# Patient Record
Sex: MALE | Race: BLACK OR AFRICAN AMERICAN | NOT HISPANIC OR LATINO | Employment: OTHER | URBAN - METROPOLITAN AREA
[De-identification: names, ages, dates, MRNs, and addresses within clinical notes are randomized per-mention and may not be internally consistent; named-entity substitution may affect disease eponyms.]

---

## 2022-03-19 ENCOUNTER — HOSPITAL ENCOUNTER (EMERGENCY)
Facility: HOSPITAL | Age: 22
Discharge: HOME | End: 2022-03-19
Attending: EMERGENCY MEDICINE
Payer: MEDICARE

## 2022-03-19 VITALS
DIASTOLIC BLOOD PRESSURE: 70 MMHG | RESPIRATION RATE: 18 BRPM | OXYGEN SATURATION: 100 % | HEIGHT: 65 IN | BODY MASS INDEX: 24.99 KG/M2 | WEIGHT: 150 LBS | SYSTOLIC BLOOD PRESSURE: 118 MMHG | TEMPERATURE: 99 F | HEART RATE: 60 BPM

## 2022-03-19 DIAGNOSIS — J30.9 ALLERGIC RHINITIS, UNSPECIFIED SEASONALITY, UNSPECIFIED TRIGGER: ICD-10-CM

## 2022-03-19 DIAGNOSIS — J10.1 INFLUENZA A: Primary | ICD-10-CM

## 2022-03-19 LAB
CTP QC/QA: YES
INFLUENZA A ANTIGEN, POC: POSITIVE
INFLUENZA B ANTIGEN, POC: NEGATIVE
POC RAPID STREP A: NEGATIVE
SARS-COV-2 RDRP RESP QL NAA+PROBE: NEGATIVE

## 2022-03-19 PROCEDURE — 25000003 PHARM REV CODE 250: Mod: ER | Performed by: NURSE PRACTITIONER

## 2022-03-19 PROCEDURE — 99284 EMERGENCY DEPT VISIT MOD MDM: CPT | Mod: 25,ER

## 2022-03-19 PROCEDURE — 87804 INFLUENZA ASSAY W/OPTIC: CPT | Mod: ER

## 2022-03-19 PROCEDURE — U0002 COVID-19 LAB TEST NON-CDC: HCPCS | Mod: ER | Performed by: NURSE PRACTITIONER

## 2022-03-19 RX ORDER — LORATADINE 10 MG/1
10 TABLET ORAL DAILY
Qty: 30 TABLET | Refills: 0 | Status: SHIPPED | OUTPATIENT
Start: 2022-03-19 | End: 2022-04-18

## 2022-03-19 RX ORDER — OSELTAMIVIR PHOSPHATE 75 MG/1
75 CAPSULE ORAL 2 TIMES DAILY
Qty: 10 CAPSULE | Refills: 0 | Status: SHIPPED | OUTPATIENT
Start: 2022-03-19 | End: 2022-03-24

## 2022-03-19 RX ORDER — IBUPROFEN 600 MG/1
600 TABLET ORAL EVERY 6 HOURS PRN
Qty: 20 TABLET | Refills: 0 | Status: SHIPPED | OUTPATIENT
Start: 2022-03-19 | End: 2022-03-24

## 2022-03-19 RX ORDER — IBUPROFEN 600 MG/1
600 TABLET ORAL
Status: COMPLETED | OUTPATIENT
Start: 2022-03-19 | End: 2022-03-19

## 2022-03-19 RX ORDER — FLUTICASONE PROPIONATE 50 MCG
1 SPRAY, SUSPENSION (ML) NASAL 2 TIMES DAILY PRN
Qty: 15 G | Refills: 0 | Status: SHIPPED | OUTPATIENT
Start: 2022-03-19 | End: 2022-04-02

## 2022-03-19 RX ORDER — DEXTROMETHORPHAN HYDROBROMIDE, GUAIFENESIN 5; 100 MG/5ML; MG/5ML
650 LIQUID ORAL EVERY 8 HOURS
Qty: 20 TABLET | Refills: 0 | Status: SHIPPED | OUTPATIENT
Start: 2022-03-19 | End: 2022-03-24

## 2022-03-19 RX ORDER — GUAIFENESIN/DEXTROMETHORPHAN 100-10MG/5
5 SYRUP ORAL 4 TIMES DAILY PRN
Qty: 240 ML | Refills: 0 | Status: SHIPPED | OUTPATIENT
Start: 2022-03-19 | End: 2022-03-29

## 2022-03-19 RX ADMIN — IBUPROFEN 600 MG: 600 TABLET ORAL at 11:03

## 2022-03-19 NOTE — Clinical Note
"Armand Duronlaurel" Andrew was seen and treated in our emergency department on 3/19/2022.  He may return to work on 03/25/2022.       If you have any questions or concerns, please don't hesitate to call.      LUKAS DuranP"

## 2022-03-19 NOTE — ED PROVIDER NOTES
Encounter Date: 3/19/2022    SCRIBE #1 NOTE: I, Reginald Vasquez, am scribing for, and in the presence of,  Deidra Patel NP. I have scribed the following portions of the note - Other sections scribed: HPI, ROS, PE.       History     Chief Complaint   Patient presents with    URI     Complains of frontal headache, nasal congestion, sore throat, and cough x2 days.      Armand Morales 21 y.o. male, with no known pertinent PMHx, presents to the ED with headache, nasal congestion, sore throat, and post nasal drip for 2 days. Patient endorses the use of OTC medications for his symptoms with some reported congestion relief. Patient denies any known sick contact. Patient denies rash, fever, chest pain, SOB, numbness, weakness, tingling, abdominal pain, back pain, dysuria, hematuria, nausea, vomiting, diarrhea, or any other complaints.  Patient rates the pain as 10 of 10 and reports Tylenol use 30 min PTA.  No known alleviating or aggravating factors.  Reports Marijuana use, no tobacco.    The history is provided by the patient. No  was used.     Review of patient's allergies indicates:  No Known Allergies  History reviewed. No pertinent past medical history.  History reviewed. No pertinent surgical history.  Family History   Problem Relation Age of Onset    Asthma Mother      Social History     Tobacco Use    Smoking status: Never Smoker    Smokeless tobacco: Never Used   Substance Use Topics    Alcohol use: No    Drug use: Yes     Types: Marijuana     Review of Systems   Constitutional: Negative for chills, fatigue and fever.   HENT: Positive for congestion, postnasal drip and sore throat. Negative for ear pain and rhinorrhea.    Eyes: Negative for pain, discharge and redness.   Respiratory: Negative for cough and shortness of breath.    Cardiovascular: Negative for chest pain.   Gastrointestinal: Negative for abdominal pain, diarrhea, nausea and vomiting.   Genitourinary: Negative for dysuria.    Musculoskeletal: Negative for back pain, neck pain and neck stiffness.   Skin: Negative for rash.   Neurological: Positive for headaches. Negative for dizziness, weakness, light-headedness and numbness.   Psychiatric/Behavioral: Negative for confusion.       Physical Exam     Initial Vitals [03/19/22 1105]   BP Pulse Resp Temp SpO2   121/73 65 14 99 °F (37.2 °C) 100 %      MAP       --         Physical Exam    Nursing note and vitals reviewed.  Constitutional: Vital signs are normal. He appears well-developed. He is cooperative.  Non-toxic appearance. He does not appear ill.   HENT:   Head: Normocephalic and atraumatic.   Right Ear: Tympanic membrane and ear canal normal.   Left Ear: Tympanic membrane and ear canal normal.   Nose: Mucosal edema present. Right sinus exhibits no maxillary sinus tenderness and no frontal sinus tenderness. Left sinus exhibits no maxillary sinus tenderness and no frontal sinus tenderness.   Mouth/Throat: Uvula is midline, oropharynx is clear and moist and mucous membranes are normal.   Post nasal drip   Eyes: Conjunctivae are normal.   Neck: No Brudzinski's sign and no Kernig's sign noted.   Normal range of motion.  Cardiovascular: Normal rate, regular rhythm and normal heart sounds.   Pulmonary/Chest: Effort normal and breath sounds normal. No accessory muscle usage. No tachypnea. No respiratory distress. He exhibits no tenderness.   Abdominal: Abdomen is soft. There is no abdominal tenderness.   Musculoskeletal:      Cervical back: Normal range of motion.     Neurological: He is alert and oriented to person, place, and time. He has normal strength. No cranial nerve deficit or sensory deficit. Gait normal. GCS eye subscore is 4. GCS verbal subscore is 5. GCS motor subscore is 6.   Skin: Skin is warm, dry and intact. No rash noted.   Psychiatric: He has a normal mood and affect. His speech is normal and behavior is normal. Judgment and thought content normal.         ED Course    Procedures  Labs Reviewed   POCT RAPID INFLUENZA A/B - Abnormal; Notable for the following components:       Result Value    Catya A Ag positive (*)     All other components within normal limits   SARS-COV-2 RDRP GENE    Narrative:     This test utilizes isothermal nucleic acid amplification   technology to detect the SARS-CoV-2 RdRp nucleic acid segment.   The analytical sensitivity (limit of detection) is 125 genome   equivalents/mL.   A POSITIVE result implies infection with the SARS-CoV-2 virus;   the patient is presumed to be contagious.     A NEGATIVE result means that SARS-CoV-2 nucleic acids are not   present above the limit of detection. A NEGATIVE result should be   treated as presumptive. It does not rule out the possibility of   COVID-19 and should not be the sole basis for treatment decisions.   If COVID-19 is strongly suspected based on clinical and exposure   history, re-testing using an alternate molecular assay should be   considered.   This test is only for use under the Food and Drug   Administration s Emergency Use Authorization (EUA).   Commercial kits are provided by [employer].   Performance characteristics of the EUA have been independently   verified by [facility] Department of   Pathology and Laboratory Medicine.   _________________________________________________________________   The authorized Fact Sheet for Healthcare Providers and the authorized Fact   Sheet for Patients of the ID NOW COVID-19 are available on the FDA   website:     https://www.fda.gov/media/506083/download  https://www.fda.gov/media/041848/download          POCT INFLUENZA A/B MOLECULAR   POCT STREP A MOLECULAR   POCT STREP A, RAPID          Imaging Results    None          Medications   ibuprofen tablet 600 mg (600 mg Oral Given 3/19/22 1134)     Medical Decision Making:   Clinical Tests:   Lab Tests: Ordered and Reviewed       APC / Resident Notes:   This is an evaluation of a 21 y.o. male  that presents to the Emergency Department for URI symptoms. The patient is a non-toxic, afebrile, and well appearing male. On physical exam: Ears: without infection.  Pharynx without infection. Appears well hydrated with moist mucus membranes. Neck soft and supple with no meningeal signs or cervical lymphadenopathy. Breath sounds are clear and equal bilaterally with no adventitious breath sounds, tachypnea or respiratory distress with room air pulse ox of 100% and no evidence of hypoxia.  Normal Neuro exam, normal gait; mucosal edema with post nasal drip    Vital Signs Are Reassuring. RESULTS: COVID negative, Influenza positive, Strep negative    The patient is within the antiviral treatment window and has been offered treatment with Tamilfu. Risks/Benefits discussed. [employer] information handout will be on the discharge paperwork.     My overall impression is Influenza A, allergic rhinitis. I considered, but at this time, do not suspect OM, OE, COVID, strep pharyngitis, meningitis, pneumonia, bacterial sinusitis, or significant dehydration requiring IV fluids or admission.    D/C Meds: Tamiflu, robitussin DM, floanse, Claritin. D/C Information: Tylenol/Ibuprofen PRN, Hydration. The diagnosis, treatment plan, instructions for follow-up and reevaluation with Primary Care as well as ED return precautions were discussed and understanding was verbalized. All questions or concerns have been addressed.        Scribe Attestation:   Scribe #1: I performed the above scribed service and the documentation accurately describes the services I performed. I attest to the accuracy of the note.               I, MG Hameed, personally performed the services described in the documentation. All medical record entries made by the scribe were at my direction and in my presence. I have reviewed the chart and agree that the record reflects my personal performance and is accurate and complete.    Clinical Impression:   Final  diagnoses:  [J10.1] Influenza A (Primary)  [J30.9] Allergic rhinitis, unspecified seasonality, unspecified trigger          ED Disposition Condition    Discharge Stable        ED Prescriptions     Medication Sig Dispense Start Date End Date Auth. Provider    oseltamivir (TAMIFLU) 75 MG capsule Take 1 capsule (75 mg total) by mouth 2 (two) times daily. for 5 days 10 capsule 3/19/2022 3/24/2022 LIAM Duran    acetaminophen (TYLENOL) 650 MG TbSR Take 1 tablet (650 mg total) by mouth every 8 (eight) hours. for 5 days 20 tablet 3/19/2022 3/24/2022 LIAM Duran    ibuprofen (ADVIL,MOTRIN) 600 MG tablet Take 1 tablet (600 mg total) by mouth every 6 (six) hours as needed for Pain. 20 tablet 3/19/2022 3/24/2022 LIAM Duran    dextromethorphan-guaiFENesin  mg/5 ml (ROBITUSSIN-DM)  mg/5 mL liquid Take 5 mLs by mouth 4 (four) times daily as needed (cough). 240 mL 3/19/2022 3/29/2022 LIAM Duran    fluticasone propionate (FLONASE) 50 mcg/actuation nasal spray 1 spray (50 mcg total) by Each Nostril route 2 (two) times daily as needed for Rhinitis or Allergies. 15 g 3/19/2022 4/2/2022 LIAM Duran    loratadine (CLARITIN) 10 mg tablet Take 1 tablet (10 mg total) by mouth once daily. 30 tablet 3/19/2022 4/18/2022 LIAM Duran        Follow-up Information     Follow up With Specialties Details Why Contact Info    [facility]  Schedule an appointment as soon as possible for a visit in 2 days  [address] [place] [address]  [phone]      [facility] ED Emergency Medicine Go to  If symptoms worsen [address] [facility] [address] [phone]           LIAM Duran  03/19/22 1229

## 2022-06-22 ENCOUNTER — HOSPITAL ENCOUNTER (EMERGENCY)
Facility: HOSPITAL | Age: 22
Discharge: HOME | End: 2022-06-22
Attending: STUDENT IN AN ORGANIZED HEALTH CARE EDUCATION/TRAINING PROGRAM
Payer: MEDICARE

## 2022-06-22 VITALS
SYSTOLIC BLOOD PRESSURE: 118 MMHG | OXYGEN SATURATION: 100 % | TEMPERATURE: 98 F | RESPIRATION RATE: 16 BRPM | HEART RATE: 51 BPM | WEIGHT: 150 LBS | DIASTOLIC BLOOD PRESSURE: 73 MMHG | BODY MASS INDEX: 24.96 KG/M2

## 2022-06-22 DIAGNOSIS — M79.641 RIGHT HAND PAIN: Primary | ICD-10-CM

## 2022-06-22 PROCEDURE — 99283 EMERGENCY DEPT VISIT LOW MDM: CPT | Mod: ER

## 2022-06-22 NOTE — ED PROVIDER NOTES
"Encounter Date: 6/22/2022    SCRIBE #1 NOTE: I, Keri Rios, am scribing for, and in the presence of,  Argelia Vasquez DO. I have scribed the following portions of the note - Other sections scribed: HPI; ROS; PE.       History     Chief Complaint   Patient presents with    Hand Pain     Pt states," I hurt my right hand yesterday."     Armand Morales is a 21 y.o. male with no known medical Hx who presents to the ED for chief complaint of right hand pain near the 5th finger s/p traumatic injury. Patient reports he accidentally punched the wall injuring his hand. He notes he is right handed. Patient rates the pain 10/10 and states pain is worse with movement. He did not attempt treatment at home. Patient denies fever,chills, swelling, weakness, nausea, vomiting, chest pain, shortness of breath or other associated symptoms. He notes no medical problems or allergies.       The history is provided by the patient. No  was used.     Review of patient's allergies indicates:  No Known Allergies  No past medical history on file.  No past surgical history on file.  Family History   Problem Relation Age of Onset    Asthma Mother      Social History     Tobacco Use    Smoking status: Never Smoker    Smokeless tobacco: Never Used   Substance Use Topics    Alcohol use: No    Drug use: Yes     Types: Marijuana     Review of Systems   Constitutional: Negative for chills and fever.   HENT: Negative for drooling, facial swelling and trouble swallowing.    Eyes: Negative for redness and visual disturbance.   Respiratory: Negative for cough, shortness of breath and stridor.    Cardiovascular: Negative for chest pain.   Gastrointestinal: Negative for abdominal pain, nausea and vomiting.   Genitourinary: Negative for dysuria and hematuria.   Musculoskeletal: Positive for myalgias. Negative for gait problem and neck stiffness.   Skin: Negative for pallor and wound.   Neurological: Negative for syncope, " facial asymmetry, speech difficulty and weakness.   Psychiatric/Behavioral: Negative for confusion.   All other systems reviewed and are negative.      Physical Exam     Initial Vitals [06/22/22 0847]   BP Pulse Resp Temp SpO2   109/68 (!) 56 16 98.4 °F (36.9 °C) 100 %      MAP       --         Physical Exam    Nursing note and vitals reviewed.  Constitutional: Vital signs are normal. He appears well-developed and well-nourished. He is not diaphoretic.  Non-toxic appearance. He does not have a sickly appearance. He does not appear ill.   HENT:   Head: Normocephalic and atraumatic.   Mouth/Throat: Uvula is midline and mucous membranes are normal.   Eyes: Conjunctivae are normal. No scleral icterus.   Neck: Neck supple.   Normal range of motion.  Cardiovascular: Normal rate, regular rhythm, normal heart sounds and intact distal pulses.   Pulmonary/Chest: Breath sounds normal. No respiratory distress.   Abdominal: Abdomen is soft. He exhibits no distension. There is no abdominal tenderness. There is no rebound and no guarding.   Musculoskeletal:         General: Normal range of motion.      Right hand: Swelling present.      Cervical back: Normal range of motion and neck supple. No rigidity. No spinous process tenderness.      Comments: Swelling with associated tenderness to palpation to the ulnar aspect of the right hand. Overlying superficial abrasion to the dorsal aspect of the right hand.      Neurological: He is alert. He has normal strength. No sensory deficit. He displays a negative Romberg sign.   Skin: Skin is warm and dry. No rash noted. No erythema.   Psychiatric: He has a normal mood and affect.         ED Course   Procedures  Labs Reviewed - No data to display       Imaging Results          X-Ray Hand 3 view Right (Final result)  Result time 06/22/22 11:06:04    Final result by Roger Parisi MD (06/22/22 11:06:04)                 Impression:      No evidence for acute fracture, bone destruction, or  dislocation.      Electronically signed by: Roger Parisi MD  Date:    06/22/2022  Time:    11:06             Narrative:    EXAMINATION:  XR HAND COMPLETE 3 VIEW RIGHT    CLINICAL HISTORY:  injury;    TECHNIQUE:  PA, lateral, and oblique views of the right hand were performed.    COMPARISON:  None    FINDINGS:  The bones are intact.  There is no evidence for acute fracture or bone destruction.  There is no evidence for dislocation.  Joint spaces are well maintained.  No bony erosions are identified.  Soft tissues are unremarkable.                                 Medications - No data to display  Medical Decision Making:   Clinical Tests:   Radiological Study: Ordered and Reviewed  ED Management:   [facility]  This is an emergent evaluation of a  21 y.o. male with no known medical Hx who presents with right hand pain s/p traumatic injury. Initial vitals in the ED with a pulse of 56 with remainder of vitals unremarkable. Physical exam noted above. DDx includes but is not limited to fracture, dislocation. Imaging including xrays of the right hand was obtained. Xrays show no acute abnormality. He was offered Tylenol for pain but stated he did not need anything for pain at this time. Repeat pulse is 51 bpm, but patient is asymptomatic and also young and athletic. He also has had similar heart rates during his previous visit. Will discharge with symptomatic treatment at home, orthopedic/PCP follow-up and ED return precautions. Patient is aware of plan and is amenable.     Argelia Vasquez D.O  EMERGENCY MEDICINE  11:12 AM 06/22/2022            Scribe Attestation:   Scribe #1: I performed the above scribed service and the documentation accurately describes the services I performed. I attest to the accuracy of the note.               Scribe attestation: I, Argelia Vasquez DO, personally performed the services described in this documentation.  All medical record entries made by the scribe were at my direction and in  my presence.  I have reviewed the chart and agree that the record reflects my personal performance and is accurate and complete.    Clinical Impression:   Final diagnoses:  [M79.641] Right hand pain (Primary)          ED Disposition Condition    Discharge Stable        ED Prescriptions     None        Follow-up Information     Follow up With Specialties Details Why Contact Info    Paul Wood MD Neonatology Schedule an appointment as soon as possible for a visit in 1 week Emergency Room Follow-up [address] [place] [address]  [phone]      [facility] ED Emergency Medicine Go to  If symptoms worsen [address] [facility] [address] [phone]    [facility] ORTHOPEDICS Orthopedics Schedule an appointment as soon as possible for a visit  As needed [address] [facility] [id number] [phone]           Argelia Vasquez,   06/29/22 1918

## 2022-06-22 NOTE — ED NOTES
Pt verbalized understanding of d/c instructions, left ambulatory with mom, pt in no distress, pt instructed to ice and elevate right hand

## 2022-06-22 NOTE — FIRST PROVIDER EVALUATION
" Emergency Department TeleTriage Encounter Note      CHIEF COMPLAINT    Chief Complaint   Patient presents with    Hand Pain     Pt states," I hurt my right hand yesterday."       VITAL SIGNS   Initial Vitals [06/22/22 0847]   BP Pulse Resp Temp SpO2   109/68 (!) 56 16 98.4 °F (36.9 °C) 100 %      MAP       --            ALLERGIES    Review of patient's allergies indicates:  No Known Allergies    PROVIDER TRIAGE NOTE  This is a teletriage evaluation of a 21 y.o. male presenting to the ED complaining of hand pain. Patient states he hit a wall with it yesterday.     There is some swelling to the hand appreciated during teletriage. Patient is in no distress.     Initial orders will be placed and care will be transferred to an alternate provider when patient is roomed for a full evaluation. Any additional orders and the final disposition will be determined by that provider.           ORDERS  Labs Reviewed - No data to display    ED Orders (720h ago, onward)    Start Ordered     Status Ordering Provider    06/22/22 0914 06/22/22 0913  Apply ice to affected area  Once         Ordered KIAN ORTIZ    06/22/22 0849 06/22/22 0849  X-Ray Hand 3 view Right  1 time imaging         Ordered MARAH VELASQUEZ            Virtual Visit Note: The provider triage portion of this emergency department evaluation and documentation was performed via [employer], a HIPAA-compliant telemedicine application, in concert with a tele-presenter in the room. A face to face patient evaluation with one of my colleagues will occur once the patient is placed in an emergency department room.      DISCLAIMER: This note was prepared with [employer] voice recognition transcription software. Garbled syntax, mangled pronouns, and other bizarre constructions may be attributed to that software system.  "